# Patient Record
Sex: MALE | Race: WHITE | NOT HISPANIC OR LATINO | Employment: OTHER | URBAN - METROPOLITAN AREA
[De-identification: names, ages, dates, MRNs, and addresses within clinical notes are randomized per-mention and may not be internally consistent; named-entity substitution may affect disease eponyms.]

---

## 2017-04-12 ENCOUNTER — ALLSCRIPTS OFFICE VISIT (OUTPATIENT)
Dept: OTHER | Facility: OTHER | Age: 72
End: 2017-04-12

## 2017-04-26 ENCOUNTER — GENERIC CONVERSION - ENCOUNTER (OUTPATIENT)
Dept: OTHER | Facility: OTHER | Age: 72
End: 2017-04-26

## 2017-05-16 ENCOUNTER — ALLSCRIPTS OFFICE VISIT (OUTPATIENT)
Dept: OTHER | Facility: OTHER | Age: 72
End: 2017-05-16

## 2017-10-10 ENCOUNTER — ALLSCRIPTS OFFICE VISIT (OUTPATIENT)
Dept: OTHER | Facility: OTHER | Age: 72
End: 2017-10-10

## 2017-10-11 NOTE — PROGRESS NOTES
Assessment  Assessed    1  Paroxysmal supraventricular tachycardia (427 0) (I47 1)   2  Hyperlipidemia (272 4) (E78 5)   3  Obesity (278 00) (E66 9)   4  Benign essential hypertension (401 1) (I10)    Discussion/Summary  Cardiology Discussion Summary Free Text Note Form [facility]:   The patient is cardiovascularly stable no arrhythmic symptoms--had SVT in February 2017 no recurrence on Cardizem--- apparently had transient hypotension during his illness with constipation bowel obstruction and shingles prompting angiogram August 2017 with no obstructive CAD-- no angina CHF palpitations symptoms- following up with endocrinology--Dr Cony Peña every 3 months monitoring his sugars--- follows regularly with ophthalmology-follows with Dr Briseida Joseph--- follows with [place] regularly--where he follows with psychiatrist, and PCP dr Amato Primer- will see podiatrist - following up in the [facility] clinic       followup with urology for history of prostate cancer [facility] has seen [facility] regularly no hypoglycemia   Uses hearing aids regularly with endocrine- followup with specialists at the [place] and internal medicine- followup with ophthalmology endocrinology urology,- has seen Dr Patti Morris in the past--suggest follow-up with neurology if any falls recurrisk factor modification strategies focus on weight lossconcepts signs and symptoms of heart disease including arrhythmia ischemia CHFquestions answeredany problems promptly to medical attentionweightmeasures reviewedpresent medical regimen as outlinedto stay compliant with therapy as prescribedup with appropriate specialists  has complex multisystem disease reviewed signs and symptoms of CAD CHF arrhythmia patient report any problems promptly to medical attention followup of multiple specialist weight loss monitor carbohydrates in diet followup of multiple specialist all questions answered safety measures reviewed followup at the [facility] followup with multiple specialists and PCPic  relates that he doesn't speak with his siblings due to his previous alcohol issues--has some frictions with his daughter avoids over conflicts--also has some marital frictionscigars occasionally-reminded to minimize or stop if possiblefollow-up  Counseling Documentation With Imm: The patient was counseled regarding  Chief Complaint  Chief Complaint Free Text Note Form: Patient seen for follow-up cardiac evaluation- history of SVT hypertension diabetes hyperlipidemia-[facility] in August 2017      History of Present Illness  Cardiology HPI Free Text Note Form [facility]: This is a 71year old with a history of SVT hypertension diabetes hyperlipidemia   Patient relates that August 2017 he developed painful shingles in the right lower back area and developed severe constipation associated with analgesic use was hospitalized at the [facility] where he had an episode of bradycardia and hypotension which prompted angiography--patient was told of no significant obstructive CAD--patient's constipation resolved-shingles are healing up----patient is following up with Dr Mary Dalal for possible prostate cancer recurrence--- patient was told by the [facility] that his lipid profile and sugars were well-controlled--no myositis on statins patient takes insulin--blood pressure controlled on therapy--patient is using a baby aspirin--- during his recent [facility] hospitalization was told to stop digoxin and metoprolol which are initiated by [facility] doctors with history of SVT patient remains on diltiazem--no complaints of angina CHF palpitations CVA TIA amaurosis orthostasis myositis or claudication patient instructed to bring copies of his testing for our review-    relates that in February 2017 he was hospitalized for 4 days at the [facility] with tachycardia he states that extensive workup with echo was CAT scans MRIs--and was treated with low-dose beta blockers calcium channel blockers and digoxin to control his SVT---- he has no angina was not told of an MI--- he was told that he could be referred for ablation but he declined--- no recurrent tahcy- arrhythmia symptoms since February2017  is diabetes and is diabetic medicines were adjusted by specialists at the [facility] regular ophthalmology checks  Patient states that he is overall feeling well at present from a cardiac perspective--- no angina palpitations or shortness of breath No CHF, , PND, orthopnea, claudication , no seizures, CVA/TIA, dizziness,, amaurosis, orthostasis, myositis or edema  no chest pressure or tightness with exertion No bowel complaints  No fevers, arrhythmic symptoms, thromboembolic symptoms  states he is followed by psychiatrist at the [place] -along with endocrinology and internists          Also saw the podiatrist in the past was told of good circulation and good sensation in his legs- Goes for regular ophthalmology evaluations- and had cataract surgery- -following up with urology, [facility], podiatry recently,--saw Dr Adriana Shaw in 2015 with MRI showing no stroke microvascular disease--- patient smokes cigars one every several days but never smoked cigarettes no alcohol use at present but did drink heavily in years past--- no drug abuse wears hearing aidspatient is [facility]--and by Dr Mejia in the past- for endocrine-   notes history of syncope due to hypoglycemia as ago had an episode of confusion earlier in 2015 evaluated by Dr Adriana Shaw neurologically no CVA on MRI testingfollows up regularly with ophthalmologists, as well as Dr Richi Patterson after history of prostate cancer states that EKG is done at the [place] were normal in the pastis followed by endocrinologist and is watching her sugars   1-2 cigars-smoking cessation offered  Patient doesn't smoke cigarettes no wheezes hemoptysis night sweats  evaluation in 2012, 2014 shows nuclear stress testing with no provokable ischemia   No angina no serious arrhythmia--2015 echo with good LV function  No serious valve disease--- 2015 carotid ultrasound with no significant obstructive ICA disease  with history of SVT and was hospitalized at [address] [place] [address] 3/12 with an episode of SVT at 140  --States that he also had an SVT episode in 2014 No recurrent arrhythmic symptoms after recent February 17 admission--- no CVA TIA amaurosishad colonoscopy small polyps and routine eye check ups  at the [facility] in the past showed an EF 60%  Normal-sized left ventricle mildly dilated  Left atrium mildly dilated  Aortic root  PA pressure 31 scan was negative  has history of diabetes- metformin and insulin-- treated by endocrinologist of prostate cancer, , status post radiation treatments  Has some urinary control issues  uses a diaper followed by Dr Billy Chavez of hypertension- on lisinopril, no cough, orthostasis hyperlipidemia- on pravastatin- no myositis asthma-  No wheezes , SVT- on Cardizem   at [facility] health clinic at [facility] had previously been on Zoloft 200 mg- patient did take Abilify in the past but had problems with overdose--No treated with Lamotigineof asthma no active bronchospasm hemoptysis or shortness of breath   blood tests demonstrated normal renal function, lipids reasonable thyroid tests within normal  [ PSVT, Asthma, hypertension, hyperlipidemia, diabetes, diabetic neuropathy, cataracts, anxiety, prostate cancer s/p radiation, DJD  Cervical radiculopathy  Prior open fracture of the finger, agent orange, uses hearing aids  ]  [ No premature family sudden death  ]  [ Former smoker  History of alcohol dependence in remission - stopped 8 years ago  No drug abuse       occasional arthritis pains left wrist notes occasional finger problems on his right hand has chronic nocturia followed by urologyhe doing well followed by the [facility] mental health clinic on multiple medications denies depression at this time    tests from March 3, 2015 with sugar of 228 normal creatinine normal potassium normal liver function tests hemoglobin A1c 7 1 cholesterol 174 HDL 47 --patient requested to bring copies of recent lab tests for our review  test 2014 no ischemia LexiScan protocol  EKG - sinus rhythm left atrial enlargement clockwise rotation J-point repolarization variant  tests from September 2015 with cholesterol 131 creatinine 0 95 sugar 165 normal electrolytes normal LFTs normal CBC hemoglobin A1c 6 5 GFR 84 no myositis on statins  patient will bring copies of cardiac testing and lab testing for our review      Review of Systems  Cardiology Male ROS:     Cardiac: as noted in HPI  Skin: Rash located on the , but-No complaints of nonhealing sores or skin rash -and-as noted in HPI-Healing shingles rash right lower back  Genitourinary: frequent urination at night-and-erectile dysfunction   Psychological: depression  General: trouble sleeping-and-lack of energy/fatigue  Respiratory: no shortness of breath-and-no cough/sputum-  History of bronchospastic tendencies  HEENT: hearing problems   Gastrointestinal: constipation, but-No complaints of liver problems, nausea, vomiting, heartburn, constipation, bloody stools, diarrhea, problems swallowing, adbominal pain, or rectal bleeding -Exacerbated by analgesics  Hematologic: No complaints of bleeding disorders, anemia, blood clots, or excessive brusing  Neurological: No complaints of numbness, tingling, dizziness, weakness, seizures, headaches, syncope or fainting, AM fatigue, daytime sleepiness, no witnessed apnea episodes  Musculoskeletal: arthritis      Active Problems  Problems    1  Acute URI (465 9) (J06 9)   2  Arthritis (716 90) (M19 90)   3  Asthma (493 90) (J45 909)   4  Benign essential hypertension (401 1) (I10)   5  Carotid bruit (785 9) (R09 89)   6  Cough (786 2) (R05)   7  Depression with anxiety (300 4) (F41 8)   8  DM type 2 (diabetes mellitus, type 2) (250 00) (E11 9)   9  Hyperlipidemia (272 4) (E78 5)   10  Obesity (278 00) (E66 9)   11  Paroxysmal supraventricular tachycardia (427 0) (I47 1)   12  Screening for skin condition (V82 0) (Z13 89)   13  Seborrheic keratosis (702 19) (L82 1)   14  SOB (shortness of breath) on exertion (786 05) (R06 02)   15  Syncope (780 2) (R55)   16  Urinary incontinence (788 30) (R32)    Past Medical History  Problems    1  History of Alcohol Abuse - In Remission (305 03)   2  History of Colonoscopy (Fiberoptic) Screening   3  History of Diabetes mellitus with polyneuropathy (250 60,357 2) (E11 42)   4  History of Exposure To Chemical Contaminants - Dioxin   5  History of Fracture Of Phalanx Of Finger (816 00)   6  History of chest pain (V13 89) (Z87 898)   7  History of paroxysmal supraventricular tachycardia (V12 59) (Z86 79)   8  History of shortness of breath (V13 89) (Z87 898)   9  History of Prostate Cancer (V10 46)   10  History of Reported Hearing Problems Using Hearing Aid Both Ears   11  History of Visit For: Single Organ System Diabetic Foot Exam (V72 9)   12  History of Visit For: Single Organ System Exam Eyes (V72 0)  Active Problems And Past Medical History Reviewed: The active problems and past medical history were reviewed and updated today  Surgical History  Problems    1  History of Cataract Surgery   2  History of Prostate Surgery   3  History of Radiation Therapy  Surgical History Reviewed: The surgical history was reviewed and updated today  Family History  Family History Reviewed: The family history was reviewed and updated today  Social History  Problems    · Cigars (2  A Day) (305 1)   · Denied: History of Drug Use   · Former smoker (V15 82) (M63 194)   · Marital History - Currently    · Wearing Diapers  Social History Reviewed: The social history was reviewed and updated today  The social history was reviewed and is unchanged  Current Meds   1  Alendronate Sodium 70 MG Oral Tablet; TAKE 1 TABLET ONCE WEEKLY Recorded   2   Aspirin 325 MG Oral Tablet; Take 1 tablet daily Recorded   3  Digoxin 125 MCG Oral Tablet; TAKE 1 TABLET DAILY; Therapy: 23XRQ4432 to Recorded   4  Dilt- MG CP24; TAKE 1 CAPSULE DAILY; Therapy: [id number] to Recorded   5  EQL Vitamin D3 1000 UNIT Oral Capsule; Take as directed Recorded   6  LamoTRIgine 25 MG Oral Tablet; TAKE 1 TABLET EVERY OTHER DAY Recorded   7  Lantus 100 UNIT/ML Subcutaneous Solution; INJECT SUBCUTANEOUSLY AS   DIRECTED sliding scale Recorded   8  Lisinopril 10 MG Oral Tablet; TAKE 1 TABLET DAILY; Therapy: [id number] to Recorded   9  MetFORMIN HCl - 1000 MG Oral Tablet; TAKE 1 TABLET TWICE DAILY; Therapy: 94IMG2198 to Recorded   10  PARoxetine HCl - 30 MG Oral Tablet; Take 1 tablet daily Recorded   11  Pravastatin Sodium 40 MG Oral Tablet; Take 1/2 tab daily; Therapy: [id number] to Recorded   12  Prazosin HCl - 1 MG Oral Capsule Recorded  Medication List Reviewed: The medication list was reviewed and updated today  Allergies  Medication    1  No Known Drug Allergies    Vitals  Vital Signs    Recorded: 12XBH3378 02:57PM   Heart Rate 66   Systolic 441   Diastolic 62   Height 5 ft 9 in   Weight 222 lb 4 00 oz   BMI Calculated 32 82   BSA Calculated 2 16     Physical Exam    Constitutional   General appearance: Abnormal  -Obese male in no distress-no shortness of breath coughing during interview no wheezing heard-    Eyes   Conjunctiva and Sclera examination: Conjunctiva pink, sclera anicteric  Ears, Nose, Mouth, and Throat - External inspection of ears and nose: Normal without deformities or discharge -Hearing aids bilaterally -Nasal mucosa, septum, and turbinates: Normal, no edema or discharge -Oropharynx: Clear, nares are clear, mucous membranes are moist    Neck   Neck and thyroid: Normal, supple, trachea midline, no thyromegaly  Pulmonary   Respiratory effort: No increased work of breathing or signs of respiratory distress      Auscultation of lungs: Abnormal  -Bilateral crackles and rhonchi no wheezes or consolidative changes  Cardiovascular   Palpation of heart: Normal PMI, no thrills  Auscultation of heart: Abnormal  -S1-S2 normal 2/6 DOYLE right and left sternal border radiating to apex no gallops thrills rubs or diastolic murmurs  Carotid pulses: Abnormal  -Soft carotid bruits  Femoral pulses: Normal, 2+ bilaterally  No abdominal aorta pulsations  Pedal pulses: Normal, 2+ bilaterally  Examination of extremities for edema and/or varicosities: Abnormal  -Marked varicose veins venous stasis dermatitis  Chest -   Sternum: Normal     Abdomen   Abdomen: Non-tender and no distention  Liver and spleen: No hepatomegaly or splenomegaly  Musculoskeletal Gait and station: Normal gait  -Digits and nails: Normal without clubbing or cyanosis -Inspection/palpation of joints, bones, and muscles: Normal, ROM normal     Skin - Skin and subcutaneous tissue: Abnormal -Healing shingles rash right lower back  Neurologic - Normal sensation on the feet to light touch -Speech: Normal     Psychiatric - Orientation to person, place, and time: Normal -Mood and affect: Normal       Future Appointments    Date/Time Provider Specialty Site   04/18/2018 10:20 AM DEANNA Montenegro   Dermatology [facility]     Signatures   Electronically signed by : DEANNA Araujo ; Oct 10 2017  4:12PM EST                       (Author)

## 2018-01-13 VITALS
WEIGHT: 222.25 LBS | HEIGHT: 69 IN | BODY MASS INDEX: 32.92 KG/M2 | HEART RATE: 66 BPM | DIASTOLIC BLOOD PRESSURE: 62 MMHG | SYSTOLIC BLOOD PRESSURE: 124 MMHG

## 2018-01-13 VITALS
WEIGHT: 224 LBS | BODY MASS INDEX: 33.18 KG/M2 | DIASTOLIC BLOOD PRESSURE: 58 MMHG | HEART RATE: 60 BPM | HEIGHT: 69 IN | SYSTOLIC BLOOD PRESSURE: 112 MMHG

## 2018-04-18 ENCOUNTER — OFFICE VISIT (OUTPATIENT)
Dept: DERMATOLOGY | Facility: CLINIC | Age: 73
End: 2018-04-18
Payer: COMMERCIAL

## 2018-04-18 DIAGNOSIS — Z13.89 SCREENING FOR SKIN CONDITION: Primary | ICD-10-CM

## 2018-04-18 DIAGNOSIS — L82.1 SEBORRHEIC KERATOSIS: ICD-10-CM

## 2018-04-18 PROCEDURE — 99213 OFFICE O/P EST LOW 20 MIN: CPT | Performed by: DERMATOLOGY

## 2018-04-18 RX ORDER — LAMOTRIGINE 25 MG/1
TABLET, CHEWABLE ORAL
COMMUNITY

## 2018-04-18 RX ORDER — CHLORAL HYDRATE 500 MG
1000 CAPSULE ORAL
COMMUNITY

## 2018-04-18 RX ORDER — DILTIAZEM HYDROCHLORIDE 240 MG/1
1 CAPSULE, COATED, EXTENDED RELEASE ORAL DAILY
COMMUNITY
Start: 2014-02-10

## 2018-04-18 RX ORDER — ALENDRONATE SODIUM 70 MG/1
1 TABLET ORAL WEEKLY
COMMUNITY

## 2018-04-18 RX ORDER — PRAZOSIN HYDROCHLORIDE 1 MG/1
1 CAPSULE ORAL
COMMUNITY

## 2018-04-18 RX ORDER — LISINOPRIL 10 MG/1
1 TABLET ORAL DAILY
COMMUNITY
Start: 2014-02-10

## 2018-04-18 RX ORDER — PAROXETINE 10 MG/1
10 TABLET, FILM COATED ORAL
COMMUNITY

## 2018-04-18 RX ORDER — INSULIN GLARGINE 100 [IU]/ML
INJECTION, SOLUTION SUBCUTANEOUS
COMMUNITY

## 2018-04-18 RX ORDER — CHOLECALCIFEROL (VITAMIN D3) 25 MCG
1000 CAPSULE ORAL
COMMUNITY

## 2018-04-18 RX ORDER — METOPROLOL SUCCINATE 25 MG/1
25 TABLET, EXTENDED RELEASE ORAL
COMMUNITY
Start: 2018-03-28

## 2018-04-18 RX ORDER — PRAVASTATIN SODIUM 40 MG
40 TABLET ORAL
COMMUNITY

## 2018-04-18 NOTE — PROGRESS NOTES
[address] [facility] [address] [facility] DERMATOLOGY  [address] [place] [address]     MRN: [id number] : 1945  Encounter: [id number]  Patient Information: Ca Randall  Chief complaint:Yearly skin check    History of present illness:  78-year-old male presents for overall checkup concerned regarding potential skin cancer with history of lots of sun exposure also history of cyst agent Orange exposure concerned regarding intermittent rash that occurs on his neck that is treated at the [place]  No past medical history on file  No past surgical history on file  Social History   History   Alcohol use Not on file     History   Drug use: Unknown     History   Smoking Status    Light Tobacco Smoker   Smokeless Tobacco    Never Used     No family history on file  Meds/Allergies   No Known Allergies    Meds:  Prior to Admission medications    Medication Sig Start Date End Date Taking?  Authorizing Provider   aspirin 81 MG tablet Take 81 mg by mouth   Yes Historical Provider, MD   Cholecalciferol (VITAMIN D-3) 1000 units CAPS Take 1,000 Units by mouth   Yes Historical Provider, MD   diltiazem (DILTIAZEM CD) 240 mg 24 hr capsule Take 1 capsule by mouth daily 2/10/14  Yes Historical Provider, MD   insulin glargine (LANTUS SOLOSTAR) injection pen 100 units/mL Inject under the skin   Yes Historical Provider, MD   insulin glargine (LANTUS) 100 units/mL subcutaneous injection Inject under the skin   Yes Historical Provider, MD   lamoTRIgine (LaMICtal) 25 MG CHEW Chew   Yes Historical Provider, MD   lisinopril (ZESTRIL) 10 mg tablet Take 1 tablet by mouth daily 2/10/14  Yes Historical Provider, MD   metFORMIN (GLUCOPHAGE) 1000 MG tablet Take 1 tablet by mouth 2 (two) times a day 2/10/14  Yes Historical Provider, MD   metoprolol succinate (TOPROL-XL) 25 mg 24 hr tablet Take 25 mg by mouth 3/28/18  Yes Historical Provider, MD   Omega-3 Fatty Acids (FISH OIL) 1,000 mg Take 1,000 mg by mouth Yes Historical Provider, MD   PARoxetine (PAXIL) 10 mg tablet Take 10 mg by mouth   Yes Historical Provider, MD   pravastatin (PRAVACHOL) 40 mg tablet Take 40 mg by mouth   Yes Historical Provider, MD   prazosin (MINIPRESS) 1 mg capsule Take 1 mg by mouth   Yes Historical Provider, MD   alendronate (FOSAMAX) 70 mg tablet Take 1 tablet by mouth once a week    Historical Provider, MD       Subjective:     Review of Systems:    General: negative for - chills, fatigue, fever,  weight gain or weight loss  Psychological: negative for - anxiety, behavioral disorder, concentration difficulties, decreased libido, depression, irritability, memory difficulties, mood swings, sleep disturbances or suicidal ideation  ENT: negative for - hearing difficulties , nasal congestion, nasal discharge, oral lesions, sinus pain, sneezing, sore throat  Allergy and Immunology: negative for - hives, insect bite sensitivity,  Hematological and Lymphatic: negative for - bleeding problems, blood clots,bruising, swollen lymph nodes  Endocrine: negative for - hair pattern changes, hot flashes, malaise/lethargy, mood swings, palpitations, polydipsia/polyuria, skin changes, temperature intolerance or unexpected weight change  Respiratory: negative for - cough, hemoptysis, orthopnea, shortness of breath, or wheezing  Cardiovascular: negative for - chest pain, dyspnea on exertion, edema,  Gastrointestinal: negative for - abdominal pain, nausea/vomiting  Genito-Urinary: negative for - dysuria, incontinence, irregular/heavy menses or urinary frequency/urgency  Musculoskeletal: negative for - gait disturbance, joint pain, joint stiffness, joint swelling, muscle pain, muscular weakness  Dermatological:  As in HPI  Neurological: negative for confusion, dizziness, headaches, impaired coordination/balance, memory loss, numbness/tingling, seizures, speech problems, tremors or weakness       Objective: There were no vitals taken for this visit      Physical Exam:    General Appearance:    Alert, cooperative, no distress   Head:    Normocephalic, without obvious abnormality, atraumatic           Skin:   A full skin exam was performed including scalp, head scalp, eyes, ears, nose, lips, neck, chest, axilla, abdomen, back, buttocks, bilateral upper extremities, bilateral lower extremities, hands, feet, fingers, toes, fingernails, and toenails  No evidence of any rash noted today normal keratotic papules with greasy stuck on appearance nothing else remarkable noted on complete exam     Assessment:     1  Screening for skin condition     2  Seborrheic keratosis           Plan:   Seborrheic Keratosis  Patient reasurred these are normal growths we acquire with age no treatment needed  Screening for Dermatologic Disorders: Nothing else of concern noted on complete exam follow up in 1 year  No rash noted at this time patient advise if he has problems he will be happy to take a look at it  Diogo Cruz MD  4/18/2018,10:35 AM    Portions of the record may have been created with voice recognition software   Occasional wrong word or "sound a like" substitutions may have occurred due to the inherent limitations of voice recognition software   Read the chart carefully and recognize, using context, where substitutions have occurred

## 2018-04-18 NOTE — PATIENT INSTRUCTIONS
Seborrheic Keratosis  Patient reasurred these are normal growths we acquire with age no treatment needed  Screening for Dermatologic Disorders: Nothing else of concern noted on complete exam follow up in 1 year  No rash noted at this time patient advise if he has problems he will be happy to take a look at it

## 2019-04-22 ENCOUNTER — OFFICE VISIT (OUTPATIENT)
Dept: DERMATOLOGY | Facility: CLINIC | Age: 74
End: 2019-04-22
Payer: COMMERCIAL

## 2019-04-22 DIAGNOSIS — Z13.89 SCREENING FOR SKIN CONDITION: ICD-10-CM

## 2019-04-22 DIAGNOSIS — L98.9 UNKNOWN SKIN LESION: Primary | ICD-10-CM

## 2019-04-22 DIAGNOSIS — L82.1 SEBORRHEIC KERATOSIS: ICD-10-CM

## 2019-04-22 PROCEDURE — 11102 TANGNTL BX SKIN SINGLE LES: CPT | Performed by: DERMATOLOGY

## 2019-04-22 PROCEDURE — 99213 OFFICE O/P EST LOW 20 MIN: CPT | Performed by: DERMATOLOGY

## 2019-04-22 PROCEDURE — 88305 TISSUE EXAM BY PATHOLOGIST: CPT | Performed by: PATHOLOGY

## 2019-04-30 ENCOUNTER — PROCEDURE VISIT (OUTPATIENT)
Dept: DERMATOLOGY | Facility: CLINIC | Age: 74
End: 2019-04-30
Payer: COMMERCIAL

## 2019-04-30 DIAGNOSIS — D04.39 SQUAMOUS CELL CARCINOMA IN SITU (SCCIS) OF SKIN OF LEFT TEMPLE REGION: Primary | ICD-10-CM

## 2019-04-30 PROCEDURE — 88305 TISSUE EXAM BY PATHOLOGIST: CPT | Performed by: PATHOLOGY

## 2019-04-30 PROCEDURE — 12052 INTMD RPR FACE/MM 2.6-5.0 CM: CPT | Performed by: DERMATOLOGY

## 2019-04-30 PROCEDURE — 11642 EXC F/E/E/N/L MAL+MRG 1.1-2: CPT | Performed by: DERMATOLOGY

## 2019-05-07 ENCOUNTER — OFFICE VISIT (OUTPATIENT)
Dept: DERMATOLOGY | Facility: CLINIC | Age: 74
End: 2019-05-07

## 2019-05-07 DIAGNOSIS — D04.39 SQUAMOUS CELL CARCINOMA IN SITU (SCCIS) OF SKIN OF LEFT TEMPLE REGION: Primary | ICD-10-CM

## 2019-05-07 PROCEDURE — 99024 POSTOP FOLLOW-UP VISIT: CPT | Performed by: DERMATOLOGY

## 2019-11-07 ENCOUNTER — OFFICE VISIT (OUTPATIENT)
Dept: DERMATOLOGY | Facility: CLINIC | Age: 74
End: 2019-11-07
Payer: COMMERCIAL

## 2019-11-07 DIAGNOSIS — L98.9 UNKNOWN SKIN LESION: Primary | ICD-10-CM

## 2019-11-07 DIAGNOSIS — L82.1 SEBORRHEIC KERATOSIS: ICD-10-CM

## 2019-11-07 DIAGNOSIS — Z13.89 SCREENING FOR SKIN CONDITION: ICD-10-CM

## 2019-11-07 DIAGNOSIS — Z85.828 HISTORY OF SKIN CANCER: ICD-10-CM

## 2019-11-07 PROCEDURE — 88305 TISSUE EXAM BY PATHOLOGIST: CPT | Performed by: STUDENT IN AN ORGANIZED HEALTH CARE EDUCATION/TRAINING PROGRAM

## 2019-11-07 PROCEDURE — 99213 OFFICE O/P EST LOW 20 MIN: CPT | Performed by: DERMATOLOGY

## 2019-11-07 PROCEDURE — 11102 TANGNTL BX SKIN SINGLE LES: CPT | Performed by: DERMATOLOGY

## 2019-11-07 NOTE — PATIENT INSTRUCTIONS
Skin lesion possible squamous cell carcinoma should be amenable to curettage  Seborrheic keratosis patient reassured these are normal growths we acquire with age no treatment needed  History of skin cancer in no recurrence nothing else atypical sunblock recommended follow-up in 6 months  Screening for dermatologic disorders nothing else of concern noted on complete exam follow-up in 6 months  Wound care instructions given to patient

## 2019-11-07 NOTE — PROGRESS NOTES
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 treated at last visit patient also having workup for some bleeding in his urinary tract history of prostate cancer  No past medical history on file  No past surgical history on file  Social History   Social History     Substance and Sexual Activity   Alcohol Use Not on file     Social History     Substance and Sexual Activity   Drug Use Not on file     Social History     Tobacco Use   Smoking Status Light Tobacco Smoker   Smokeless Tobacco Never Used     No family history on file  Meds/Allergies   No Known Allergies    Meds:  Prior to Admission medications    Medication Sig Start Date End Date Taking?  Authorizing Provider   aspirin 81 MG tablet Take 81 mg by mouth   Yes Historical Provider, MD   Cholecalciferol (VITAMIN D-3) 1000 units CAPS Take 1,000 Units by mouth   Yes Historical Provider, MD   diltiazem (DILTIAZEM CD) 240 mg 24 hr capsule Take 1 capsule by mouth daily 2/10/14  Yes Historical Provider, MD   insulin glargine (LANTUS SOLOSTAR) injection pen 100 units/mL Inject under the skin   Yes Historical Provider, MD   insulin glargine (LANTUS) 100 units/mL subcutaneous injection Inject under the skin   Yes Historical Provider, MD   lamoTRIgine (LaMICtal) 25 MG CHEW Chew   Yes Historical Provider, MD   lisinopril (ZESTRIL) 10 mg tablet Take 1 tablet by mouth daily 2/10/14  Yes Historical Provider, MD   metFORMIN (GLUCOPHAGE) 1000 MG tablet Take 1 tablet by mouth 2 (two) times a day 2/10/14  Yes Historical Provider, MD   metoprolol succinate (TOPROL-XL) 25 mg 24 hr tablet Take 25 mg by mouth 3/28/18  Yes Historical Provider, MD   Omega-3 Fatty Acids (FISH OIL) 1,000 mg Take 1,000 mg by mouth   Yes Historical Provider, MD   PARoxetine (PAXIL) 10 mg tablet Take 10 mg by mouth   Yes Historical Provider, MD   pravastatin (PRAVACHOL) 40 mg tablet Take 40 mg by mouth   Yes Historical Provider, MD   prazosin (MINIPRESS) 1 mg capsule Take 1 mg by mouth   Yes Historical Provider, MD   alendronate (FOSAMAX) 70 mg tablet Take 1 tablet by mouth once a week    Historical Provider, MD       Subjective:     Review of Systems:    General: negative for - chills, fatigue, fever,  weight gain or weight loss  Psychological: negative for - anxiety, behavioral disorder, concentration difficulties, decreased libido, depression, irritability, memory difficulties, mood swings, sleep disturbances or suicidal ideation  ENT: negative for - hearing difficulties , nasal congestion, nasal discharge, oral lesions, sinus pain, sneezing, sore throat  Allergy and Immunology: negative for - hives, insect bite sensitivity,  Hematological and Lymphatic: negative for - bleeding problems, blood clots,bruising, swollen lymph nodes  Endocrine: negative for - hair pattern changes, hot flashes, malaise/lethargy, mood swings, palpitations, polydipsia/polyuria, skin changes, temperature intolerance or unexpected weight change  Respiratory: negative for - cough, hemoptysis, orthopnea, shortness of breath, or wheezing  Cardiovascular: negative for - chest pain, dyspnea on exertion, edema,  Gastrointestinal: negative for - abdominal pain, nausea/vomiting  Genito-Urinary: negative for - dysuria, incontinence, irregular/heavy menses or urinary frequency/urgency  Musculoskeletal: negative for - gait disturbance, joint pain, joint stiffness, joint swelling, muscle pain, muscular weakness  Dermatological:  As in HPI  Neurological: negative for confusion, dizziness, headaches, impaired coordination/balance, memory loss, numbness/tingling, seizures, speech problems, tremors or weakness       Objective: There were no vitals taken for this visit  Physical Exam:    General Appearance:    Alert, cooperative, no distress   Head:    Normocephalic, without obvious abnormality, atraumatic           Skin:   A full skin exam was performed including scalp, head scalp, eyes, ears, nose, lips, neck, chest, axilla, abdomen, back, buttocks, bilateral upper extremities, bilateral lower extremities, hands, feet, fingers, toes, fingernails, and toenails 5 mm erythematous scaling keratotic area noted on the mid chest normal keratotic papules with greasy stuck on appearance previous sites skin cancer well healed without recurrence nothing else atypical noted on exam       Shave Biopsy Procedure Note    Pre-operative Diagnosis:  Rule out squamous cell carcinoma    Plan:  1  Instructed to keep the wound dry and covered for 24 and clean thereafter  2  Warning signs of infection were reviewed  3  Recommended that the patient use OTC acetaminophen as needed for pain  4  Return  Pending results of biopsy(ies)    Locations:  Mid chest    Indications:  Suspicious lesion    Anesthesia: Lidocaine 1% with epinephrine without added sodium bicarbonate    Procedure Details     Patient informed of the risks (including bleeding and infection) and benefits of the   procedure and Verbal informed consent obtained  The lesion and surrounding area were given a sterile prep using alcohol and draped in the usual sterile fashion  A Blue blade razor was used to obtain a specimen  Hemostasis achieved with aluminum chloride  Petrolatum and a sterile dressing applied  The specimen was sent for pathologic examination  The patient tolerated the procedure(s) well  Complications:  none  Assessment:     1  Unknown skin lesion     2  Seborrheic keratosis     3  Screening for skin condition     4   History of skin cancer           Plan:   Skin lesion possible squamous cell carcinoma should be amenable to curettage  Seborrheic keratosis patient reassured these are normal growths we acquire with age no treatment needed  History of skin cancer in no recurrence nothing else atypical sunblock recommended follow-up in 6 months  Screening for dermatologic disorders nothing else of concern noted on complete exam follow-up in 6 months    Paulo Larios MD  11/7/2019,8:05 AM    Portions of the record may have been created with voice recognition software   Occasional wrong word or "sound a like" substitutions may have occurred due to the inherent limitations of voice recognition software   Read the chart carefully and recognize, using context, where substitutions have occurred

## 2019-12-19 ENCOUNTER — OFFICE VISIT (OUTPATIENT)
Dept: DERMATOLOGY | Facility: CLINIC | Age: 74
End: 2019-12-19
Payer: COMMERCIAL

## 2019-12-19 DIAGNOSIS — D04.5 SQUAMOUS CELL CARCINOMA IN SITU (SCCIS) OF SKIN OF CHEST: Primary | ICD-10-CM

## 2019-12-19 PROCEDURE — 17260 DSTRJ MAL LES T/A/L 0.5 CM/<: CPT | Performed by: DERMATOLOGY

## 2019-12-19 NOTE — PROGRESS NOTES
[address] [facility] DERMATOLOGY  [address] [address]  [phone]  [phone]     MRN: [id number] : 1945  Encounter: [id number]  Patient Information: Garrison Gaviria    Subjective:     60-year-old male presents for follow-up for previously biopsied squamous cell carcinoma in situ mid chest and planned treatment     Objective: There were no vitals taken for this visit  Physical Exam:    General Appearance:    Alert, cooperative, no distress   Skin:   Previous site of biopsy noted  Procedure: Curettage & Electrodessication squamous cell carcinoma situ  The reasons for the procedure were explained to the patient  The benefits and risks of the procedure were explained to the patient, including bleeding, infection, incomplete removal, prolonged anesthesia (weeks to months) and rarely nerve damage  The patient is aware that a scar will result from the procedure  The consent for the procedure was obtained verbally and in writing  Lesion Site:  Mid chest Curetted Area Size (mm): 5    Using a sterile curette, the appropriate area was curetted  The area was curetted and electrodesiccated x3  Final defect size: 6mm    The wound was left to heal by secondary intention  The wound was cleansed then covered with a dressing  Wound care instructions were verbally given and in writing  I performed the entire procedure  Patient tolerated procedure well  Assessment:     1  Squamous cell carcinoma in situ (SCCIS) of skin of chest           Plan:   Wound care instructions given to patient        Prior to Admission medications    Medication Sig Start Date End Date Taking?  Authorizing Provider   alendronate (FOSAMAX) 70 mg tablet Take 1 tablet by mouth once a week    Historical Provider, MD   aspirin 81 MG tablet Take 81 mg by mouth    Historical Provider, MD   Cholecalciferol (VITAMIN D-3) 1000 units CAPS Take 1,000 Units by mouth    Historical Provider, MD diltiazem (DILTIAZEM CD) 240 mg 24 hr capsule Take 1 capsule by mouth daily 2/10/14   Historical Provider, MD   insulin glargine (LANTUS SOLOSTAR) injection pen 100 units/mL Inject under the skin    Historical Provider, MD   insulin glargine (LANTUS) 100 units/mL subcutaneous injection Inject under the skin    Historical Provider, MD   lamoTRIgine (LaMICtal) 25 MG CHEW Chew    Historical Provider, MD   lisinopril (ZESTRIL) 10 mg tablet Take 1 tablet by mouth daily 2/10/14   Historical Provider, MD   metFORMIN (GLUCOPHAGE) 1000 MG tablet Take 1 tablet by mouth 2 (two) times a day 2/10/14   Historical Provider, MD   metoprolol succinate (TOPROL-XL) 25 mg 24 hr tablet Take 25 mg by mouth 3/28/18   Historical Provider, MD   Omega-3 Fatty Acids (FISH OIL) 1,000 mg Take 1,000 mg by mouth    Historical Provider, MD   PARoxetine (PAXIL) 10 mg tablet Take 10 mg by mouth    Historical Provider, MD   pravastatin (PRAVACHOL) 40 mg tablet Take 40 mg by mouth    Historical Provider, MD   prazosin (MINIPRESS) 1 mg capsule Take 1 mg by mouth    Historical Provider, MD     No Known Allergies    Bella Trejo MD  19/46/5065,2:51 PM    Portions of the record may have been created with voice recognition software   Occasional wrong word or "sound a like" substitutions may have occurred due to the inherent limitations of voice recognition software   Read the chart carefully and recognize, using context, where substitutions have occurred

## 2020-01-28 ENCOUNTER — CLINICAL SUPPORT (OUTPATIENT)
Dept: DERMATOLOGY | Facility: CLINIC | Age: 75
End: 2020-01-28

## 2020-01-28 DIAGNOSIS — D04.5 SQUAMOUS CELL CARCINOMA IN SITU (SCCIS) OF SKIN OF CHEST: Primary | ICD-10-CM

## 2020-01-28 PROCEDURE — 99024 POSTOP FOLLOW-UP VISIT: CPT | Performed by: DERMATOLOGY

## 2020-01-28 NOTE — PROGRESS NOTES
[address]  [address] [facility] [address]  [phone]  [phone]     MRN: [id number] : 1945  Encounter: [id number]  Patient Information: Madison Pantoja    Subjective:     81-year-old male presents for follow-up for previously curetted squamous cell carcinoma in situ mid chest no problems noted patient did have some inflammation in the area previously thought it was infected treated with triple antibiotic appear to improve     Objective: There were no vitals taken for this visit  Physical Exam:    General Appearance:    Alert, cooperative, no distress   Skin:   Previous site of curettage well-healed without evidence of disease     Assessment:     1  Squamous cell carcinoma in situ (SCCIS) of skin of chest           Plan:   Previous site well-healed without evidence of disease no further treatment needed follow-up in 6 months      Prior to Admission medications    Medication Sig Start Date End Date Taking?  Authorizing Provider   alendronate (FOSAMAX) 70 mg tablet Take 1 tablet by mouth once a week   Yes Historical Provider, MD   aspirin 81 MG tablet Take 81 mg by mouth   Yes Historical Provider, MD   Cholecalciferol (VITAMIN D-3) 1000 units CAPS Take 1,000 Units by mouth   Yes Historical Provider, MD   diltiazem (DILTIAZEM CD) 240 mg 24 hr capsule Take 1 capsule by mouth daily 2/10/14  Yes Historical Provider, MD   insulin aspart (NovoLOG) 100 Units/mL injection pen Inject under the skin   Yes Historical Provider, MD   insulin glargine (LANTUS SOLOSTAR) injection pen 100 units/mL Inject 36 Units under the skin    Yes Historical Provider, MD   insulin glargine (LANTUS) 100 units/mL subcutaneous injection Inject under the skin    Yes Historical Provider, MD   lamoTRIgine (LaMICtal) 25 MG CHEW Chew   Yes Historical Provider, MD   lisinopril (ZESTRIL) 10 mg tablet Take 1 tablet by mouth daily 2/10/14  Yes Historical Provider, MD   metFORMIN (GLUCOPHAGE) 1000 MG tablet Take 1 tablet by mouth 2 (two) times a day 2/10/14  Yes Historical Provider, MD   metoprolol succinate (TOPROL-XL) 25 mg 24 hr tablet Take 25 mg by mouth 3/28/18  Yes Historical Provider, MD   Omega-3 Fatty Acids (FISH OIL) 1,000 mg Take 1,000 mg by mouth   Yes Historical Provider, MD   PARoxetine (PAXIL) 10 mg tablet Take 10 mg by mouth   Yes Historical Provider, MD   pravastatin (PRAVACHOL) 40 mg tablet Take 40 mg by mouth   Yes Historical Provider, MD   prazosin (MINIPRESS) 1 mg capsule Take 1 mg by mouth   Yes Historical Provider, MD     No Known Allergies    Savanna Cosby MD  1/28/2020,1:23 PM    Portions of the record may have been created with voice recognition software   Occasional wrong word or "sound a like" substitutions may have occurred due to the inherent limitations of voice recognition software   Read the chart carefully and recognize, using context, where substitutions have occurred

## 2020-01-28 NOTE — PATIENT INSTRUCTIONS
Previous site well-healed without evidence of disease no further treatment needed follow-up in 6 months

## 2020-03-04 ENCOUNTER — TELEPHONE (OUTPATIENT)
Dept: CARDIOLOGY CLINIC | Facility: CLINIC | Age: 75
End: 2020-03-04

## 2020-04-27 ENCOUNTER — TELEMEDICINE (OUTPATIENT)
Dept: DERMATOLOGY | Facility: CLINIC | Age: 75
End: 2020-04-27
Payer: COMMERCIAL

## 2020-04-27 DIAGNOSIS — Z85.828 HISTORY OF SKIN CANCER: ICD-10-CM

## 2020-04-27 DIAGNOSIS — L82.1 SEBORRHEIC KERATOSIS: Primary | ICD-10-CM

## 2020-04-27 DIAGNOSIS — Z13.89 SCREENING FOR SKIN CONDITION: ICD-10-CM

## 2020-04-27 PROCEDURE — 99213 OFFICE O/P EST LOW 20 MIN: CPT | Performed by: DERMATOLOGY

## 2020-04-27 RX ORDER — DULOXETIN HYDROCHLORIDE 30 MG/1
30 CAPSULE, DELAYED RELEASE ORAL DAILY
COMMUNITY

## 2020-04-29 ENCOUNTER — OFFICE VISIT (OUTPATIENT)
Dept: CARDIOLOGY CLINIC | Facility: CLINIC | Age: 75
End: 2020-04-29
Payer: COMMERCIAL

## 2020-04-29 VITALS
HEIGHT: 69 IN | DIASTOLIC BLOOD PRESSURE: 56 MMHG | OXYGEN SATURATION: 98 % | WEIGHT: 227 LBS | BODY MASS INDEX: 33.62 KG/M2 | HEART RATE: 66 BPM | SYSTOLIC BLOOD PRESSURE: 126 MMHG

## 2020-04-29 DIAGNOSIS — E11.9 DIABETES MELLITUS WITHOUT COMPLICATION (HCC): ICD-10-CM

## 2020-04-29 DIAGNOSIS — I10 ESSENTIAL HYPERTENSION: ICD-10-CM

## 2020-04-29 DIAGNOSIS — E78.2 MIXED HYPERLIPIDEMIA: ICD-10-CM

## 2020-04-29 DIAGNOSIS — I47.1 AVNRT (AV NODAL RE-ENTRY TACHYCARDIA) (HCC): Primary | ICD-10-CM

## 2020-04-29 DIAGNOSIS — E66.9 OBESITY (BMI 30-39.9): ICD-10-CM

## 2020-04-29 DIAGNOSIS — C61 PROSTATE CANCER (HCC): ICD-10-CM

## 2020-04-29 PROCEDURE — 99214 OFFICE O/P EST MOD 30 MIN: CPT | Performed by: INTERNAL MEDICINE

## 2020-04-29 PROCEDURE — 93000 ELECTROCARDIOGRAM COMPLETE: CPT | Performed by: INTERNAL MEDICINE

## 2020-05-15 ENCOUNTER — TRANSCRIBE ORDERS (OUTPATIENT)
Dept: LAB | Facility: CLINIC | Age: 75
End: 2020-05-15

## 2020-05-15 ENCOUNTER — APPOINTMENT (OUTPATIENT)
Dept: LAB | Facility: CLINIC | Age: 75
End: 2020-05-15
Payer: COMMERCIAL

## 2020-05-15 DIAGNOSIS — C61 MALIGNANT NEOPLASM OF PROSTATE (HCC): Primary | ICD-10-CM

## 2020-05-15 DIAGNOSIS — C61 MALIGNANT NEOPLASM OF PROSTATE (HCC): ICD-10-CM

## 2020-05-15 LAB — PSA SERPL-MCNC: 2.7 NG/ML (ref 0–4)

## 2020-05-15 PROCEDURE — 84153 ASSAY OF PSA TOTAL: CPT

## 2020-10-27 ENCOUNTER — OFFICE VISIT (OUTPATIENT)
Dept: DERMATOLOGY | Facility: CLINIC | Age: 75
End: 2020-10-27
Payer: COMMERCIAL

## 2020-10-27 VITALS — TEMPERATURE: 97.3 F

## 2020-10-27 DIAGNOSIS — Z85.828 HISTORY OF SKIN CANCER: ICD-10-CM

## 2020-10-27 DIAGNOSIS — L82.1 SEBORRHEIC KERATOSIS: Primary | ICD-10-CM

## 2020-10-27 DIAGNOSIS — Z13.89 SCREENING FOR SKIN CONDITION: ICD-10-CM

## 2020-10-27 PROCEDURE — 99213 OFFICE O/P EST LOW 20 MIN: CPT | Performed by: DERMATOLOGY

## 2021-04-12 ENCOUNTER — OFFICE VISIT (OUTPATIENT)
Dept: CARDIOLOGY CLINIC | Facility: CLINIC | Age: 76
End: 2021-04-12
Payer: COMMERCIAL

## 2021-04-12 VITALS
OXYGEN SATURATION: 97 % | WEIGHT: 225 LBS | HEART RATE: 56 BPM | DIASTOLIC BLOOD PRESSURE: 60 MMHG | BODY MASS INDEX: 33.33 KG/M2 | SYSTOLIC BLOOD PRESSURE: 130 MMHG | HEIGHT: 69 IN

## 2021-04-12 DIAGNOSIS — E78.2 MIXED HYPERLIPIDEMIA: ICD-10-CM

## 2021-04-12 DIAGNOSIS — I47.1 AVNRT (AV NODAL RE-ENTRY TACHYCARDIA) (HCC): Primary | ICD-10-CM

## 2021-04-12 DIAGNOSIS — E11.9 DIABETES MELLITUS WITHOUT COMPLICATION (HCC): ICD-10-CM

## 2021-04-12 DIAGNOSIS — I10 ESSENTIAL HYPERTENSION: ICD-10-CM

## 2021-04-12 DIAGNOSIS — G47.39 OTHER SLEEP APNEA: ICD-10-CM

## 2021-04-12 PROCEDURE — 99214 OFFICE O/P EST MOD 30 MIN: CPT | Performed by: INTERNAL MEDICINE

## 2021-04-12 NOTE — PROGRESS NOTES
[facility]  [phone] [facility] [place] [address]  Cardiology Follow Up    Poppy Baer  1945  [id number]      1  AVNRT (AV jose re-entry tachycardia) ([facility] )     2  Essential hypertension     3  Mixed hyperlipidemia     4  Diabetes mellitus without complication ([facility] )     5  Other sleep apnea         Chief Complaint   Patient presents with    Follow-up     1 year follow up       Interval History:    80-year-old male with history of AVNRT status post ablation in May 2018(partial ablation in view of ectopic beats development but no inducible AVNRT post ablation), hypertension, hyperlipidemia,  Insulin-dependent diabetes mellitus, prostate cancer status post radiotherapy with recurrent and currently on hormonal therapy, obstructive sleep apnea on CPAP presented for cardiology follow-up     patient is doing okay since last cardiology clinic visit   He denies any chest pain, shortness of breath, palpitation, dizziness, orthopnea, paroxysmal nocturnal dyspnea or loss of consciousness since last clinic visit   He ambulates slowly at baseline due to right hip pain   He is following at [place] for his routine care   As per patient he recently had blood test done at [place] and everything was okay    His recent HbA1c was 6 is cholesterol was well controlled     he is getting hormonal therapy for his recurrent prostate cancer   he has noted his blood pressure around 160/70 when he wakes up in the morning and it is controlled after taking morning meds   His metoprolol was stopped since last clinic visit and exit reason unknown     current medications reviewed   No recent labs available for review    Review of Systems:   all review of system negative except as mentioned above    Patient Active Problem List   Diagnosis    Seborrheic keratosis    Screening for skin condition     Past Medical History:   Diagnosis Date    Cancer ([facility] )     prostate    Diabetes mellitus ([facility] )     Hypertension      Social History     Socioeconomic History    Marital status: /Civil Union     Spouse name: Not on file    Number of children: Not on file    Years of education: Not on file    Highest education level: Not on file   Occupational History    Not on file   Social Needs    Financial resource strain: Not on file    Food insecurity     Worry: Not on file     Inability: Not on file    Transportation needs     Medical: Not on file     Non-medical: Not on file   Tobacco Use    Smoking status: Light Tobacco Smoker     Types: Cigars    Smokeless tobacco: Never Used   Substance and Sexual Activity    Alcohol use: Never     Frequency: Never    Drug use: Never    Sexual activity: Not on file   Lifestyle    Physical activity     Days per week: Not on file     Minutes per session: Not on file    Stress: Not on file   Relationships    Social connections     Talks on phone: Not on file     Gets together: Not on file     Attends Islam service: Not on file     Active member of club or organization: Not on file     Attends meetings of clubs or organizations: Not on file     Relationship status: Not on file    Intimate partner violence     Fear of current or ex partner: Not on file     Emotionally abused: Not on file     Physically abused: Not on file     Forced sexual activity: Not on file   Other Topics Concern    Not on file   Social History Narrative    Not on file      Family History   Problem Relation Age of Onset    Heart attack Father      History reviewed  No pertinent surgical history      Current Outpatient Medications:     aspirin 81 MG tablet, Take 81 mg by mouth, Disp: , Rfl:     Calcium Carb-Cholecalciferol (CALCIUM 500+D PO), Take by mouth, Disp: , Rfl:     Cholecalciferol (VITAMIN D-3) 1000 units CAPS, Take 1,000 Units by mouth, Disp: , Rfl:     diltiazem (DILTIAZEM CD) 240 mg 24 hr capsule, Take 1 capsule by mouth daily, Disp: , Rfl:     DULoxetine (CYMBALTA) 30 mg delayed release capsule, Take 30 mg by mouth daily, Disp: , Rfl:     Empagliflozin-linaGLIPtin 10-5 MG TABS, Take by mouth, Disp: , Rfl:     insulin aspart (NovoLOG) 100 Units/mL injection pen, Inject under the skin, Disp: , Rfl:     insulin glargine (LANTUS SOLOSTAR) injection pen 100 units/mL, Inject 30 Units under the skin , Disp: , Rfl:     lamoTRIgine (LaMICtal) 25 MG CHEW, Chew, Disp: , Rfl:     lisinopril (ZESTRIL) 10 mg tablet, Take 1 tablet by mouth daily, Disp: , Rfl:     metFORMIN (GLUCOPHAGE) 1000 MG tablet, Take 500 mg by mouth 2 (two) times a day , Disp: , Rfl:     Omega-3 Fatty Acids (FISH OIL) 1,000 mg, Take 1,000 mg by mouth, Disp: , Rfl:     PARoxetine (PAXIL) 10 mg tablet, Take 10 mg by mouth, Disp: , Rfl:     pravastatin (PRAVACHOL) 40 mg tablet, Take 40 mg by mouth, Disp: , Rfl:     prazosin (MINIPRESS) 1 mg capsule, Take 1 mg by mouth, Disp: , Rfl:     alendronate (FOSAMAX) 70 mg tablet, Take 1 tablet by mouth once a week, Disp: , Rfl:     insulin glargine (LANTUS) 100 units/mL subcutaneous injection, Inject under the skin , Disp: , Rfl:     metoprolol succinate (TOPROL-XL) 25 mg 24 hr tablet, Take 25 mg by mouth, Disp: , Rfl:   No Known Allergies    Labs:  No visits with results within 6 Month(s) from this visit  Latest known visit with results is:   Appointment on 05/15/2020   Component Date Value    PSA, Diagnostic 05/15/2020 2 7      Imaging: No results found      Physical Exam:  General:   Obese, awake, alert and oriented x3, not in distress, ambulates slowly due to right hip pain  Neck: supple, no JVD  Eyes: PERRL, conjunctiva normal  Lungs:  Bilateral air entry positive, no wheeze/rhonchi or crackle  Heart:  S1-S2 normal, no murmur  Abdomen:  Soft ,nondistended ,nontender, bowel sounds positive  Extremities:  Trivial bilateral leg edema, no deformity, ROM normal  Neuro:  Moving all extremities, speech clear  Skin: warm, no rash    Ht 5' 9" (1 753 m)   Wt 102 kg (225 lb) Comment: patient reported  BMI 33 23 kg/m²     Cardiographics :   as per patient he had nuclear stress test in 2019 is was okay done at [facility]   He follows up with [facility] cardiologist      Assessment:    1  AVNRT status post ablation in May of 2018  (partial ablation in view of ectopic beats development but no inducible AVNRT post ablation)   asymptomatic at present time  2    Hypertension  Controlled  3  Hyperlipidemia  On pravastatin  4  Prostate cancer status post radiotherapy  Recently diagnosed to have recurrence and currently on hormonal therapy  5  BMI 33  6  Obstructive sleep apnea on CPAP    Recommendations:   patient is doing okay from cardiology standpoint at present time   Patient to continue baby aspirin, pravastatin 40 mg, Cardizem 240 daily lisinopril 10 mg     was advised to monitor blood pressure at home  He was advised to take lisinopril in afternoon     advised to take low-salt, low-fat/ low-cholesterol diabetic diet and try to lose weight   He was advised to elevate lower extremity whenever possible   patient will bring all the lab test and cardiac workup results on next visit    Return to clinic in 1 year or early if needed  To follow-up with cardiologist at [facility]   Above all discussed with patient    Patient understands and agrees

## 2021-04-28 ENCOUNTER — OFFICE VISIT (OUTPATIENT)
Dept: DERMATOLOGY | Facility: CLINIC | Age: 76
End: 2021-04-28
Payer: COMMERCIAL

## 2021-04-28 VITALS — TEMPERATURE: 97.6 F

## 2021-04-28 DIAGNOSIS — L82.1 SEBORRHEIC KERATOSIS: Primary | ICD-10-CM

## 2021-04-28 DIAGNOSIS — Z13.89 SCREENING FOR SKIN CONDITION: ICD-10-CM

## 2021-04-28 DIAGNOSIS — Z85.828 HISTORY OF SKIN CANCER: ICD-10-CM

## 2021-04-28 PROCEDURE — 99213 OFFICE O/P EST LOW 20 MIN: CPT | Performed by: DERMATOLOGY

## 2021-04-28 NOTE — PROGRESS NOTES
[facility] DERMATOLOGY  [address] [place] [address]  [facility] [address]  [phone]  [phone]     MRN: [id number] : 1945  Encounter: [id number]  Patient Information: Kevin Norwood  Chief complaint: yearly check up    History of present illness:  78-year-old male with previous history skin cancer presents for overall checkup no specific concerns noted  Past Medical History:   Diagnosis Date    Cancer [facility])     prostate    Diabetes mellitus ([facility] [address] )     Hypertension      No past surgical history on file  Social History   Social History     Substance and Sexual Activity   Alcohol Use Never    Frequency: Never     Social History     Substance and Sexual Activity   Drug Use Never     Social History     Tobacco Use   Smoking Status Light Tobacco Smoker    Types: Cigars   Smokeless Tobacco Never Used     Family History   Problem Relation Age of Onset    Heart attack Father      Meds/Allergies   No Known Allergies    Meds:  Prior to Admission medications    Medication Sig Start Date End Date Taking?  Authorizing Provider   alendronate (FOSAMAX) 70 mg tablet Take 1 tablet by mouth once a week   Yes Historical Provider, MD   aspirin 81 MG tablet Take 81 mg by mouth   Yes Historical Provider, MD   Calcium Carb-Cholecalciferol (CALCIUM 500+D PO) Take by mouth   Yes Historical Provider, MD   Cholecalciferol (VITAMIN D-3) 1000 units CAPS Take 1,000 Units by mouth   Yes Historical Provider, MD   diltiazem (DILTIAZEM CD) 240 mg 24 hr capsule Take 1 capsule by mouth daily 2/10/14  Yes Historical Provider, MD   DULoxetine (CYMBALTA) 30 mg delayed release capsule Take 30 mg by mouth daily   Yes Historical Provider, MD   Empagliflozin-linaGLIPtin 10-5 MG TABS Take by mouth   Yes Historical Provider, MD   insulin aspart (NovoLOG) 100 Units/mL injection pen Inject under the skin   Yes Historical Provider, MD   insulin glargine (LANTUS SOLOSTAR) injection pen 100 units/mL Inject 30 Units under the skin    Yes Historical Provider, MD   insulin glargine (LANTUS) 100 units/mL subcutaneous injection Inject under the skin    Yes Historical Provider, MD   lamoTRIgine (LaMICtal) 25 MG CHEW Chew   Yes Historical Provider, MD   lisinopril (ZESTRIL) 10 mg tablet Take 1 tablet by mouth daily 2/10/14  Yes Historical Provider, MD   metFORMIN (GLUCOPHAGE) 1000 MG tablet Take 500 mg by mouth 2 (two) times a day  2/10/14  Yes Historical Provider, MD   metoprolol succinate (TOPROL-XL) 25 mg 24 hr tablet Take 25 mg by mouth 3/28/18  Yes Historical Provider, MD   Omega-3 Fatty Acids (FISH OIL) 1,000 mg Take 1,000 mg by mouth   Yes Historical Provider, MD   PARoxetine (PAXIL) 10 mg tablet Take 10 mg by mouth   Yes Historical Provider, MD   pravastatin (PRAVACHOL) 40 mg tablet Take 40 mg by mouth   Yes Historical Provider, MD   prazosin (MINIPRESS) 1 mg capsule Take 1 mg by mouth   Yes Historical Provider, MD       Subjective:     Review of Systems:    General: negative for - chills, fatigue, fever,  weight gain or weight loss  Psychological: negative for - anxiety, behavioral disorder, concentration difficulties, decreased libido, depression, irritability, memory difficulties, mood swings, sleep disturbances or suicidal ideation  ENT: negative for - hearing difficulties , nasal congestion, nasal discharge, oral lesions, sinus pain, sneezing, sore throat  Allergy and Immunology: negative for - hives, insect bite sensitivity,  Hematological and Lymphatic: negative for - bleeding problems, blood clots,bruising, swollen lymph nodes  Endocrine: negative for - hair pattern changes, hot flashes, malaise/lethargy, mood swings, palpitations, polydipsia/polyuria, skin changes, temperature intolerance or unexpected weight change  Respiratory: negative for - cough, hemoptysis, orthopnea, shortness of breath, or wheezing  Cardiovascular: negative for - chest pain, dyspnea on exertion, edema,  Gastrointestinal: negative for - abdominal pain, nausea/vomiting  Genito-Urinary: negative for - dysuria, incontinence, irregular/heavy menses or urinary frequency/urgency  Musculoskeletal: negative for - gait disturbance, joint pain, joint stiffness, joint swelling, muscle pain, muscular weakness  Dermatological:  As in HPI  Neurological: negative for confusion, dizziness, headaches, impaired coordination/balance, memory loss, numbness/tingling, seizures, speech problems, tremors or weakness       Objective:   Temp 97 6 °F (36 4 °C) (Temporal)     Physical Exam:    General Appearance:    Alert, cooperative, no distress   Head:    Normocephalic, without obvious abnormality, atraumatic           Skin:   A full skin exam was performed including scalp, head scalp, eyes, ears, nose, lips, neck, chest, axilla, abdomen, back, buttocks, bilateral upper extremities, bilateral lower extremities, hands, feet, fingers, toes, fingernails, and toenails normal keratotic papules greasy stuck appearance nothing else remarkable noted on complete exam previous sites skin cancer well healed without recurrence     Assessment:     1  Seborrheic keratosis     2  History of skin cancer     3  Screening for skin condition           Plan:   Seborrheic keratosis patient reassured these are normal growths we acquire with age no treatment needed  History of skin cancer in no recurrence nothing else atypical sunblock recommended follow-up in 1 year  Screening for dermatologic disorders nothing else of concern noted on complete exam follow-up in 1 year      Bren Quarles MD  4/28/2021,2:29 PM    Portions of the record may have been created with voice recognition software   Occasional wrong word or "sound a like" substitutions may have occurred due to the inherent limitations of voice recognition software   Read the chart carefully and recognize, using context, where substitutions have occurred